# Patient Record
Sex: FEMALE | URBAN - METROPOLITAN AREA
[De-identification: names, ages, dates, MRNs, and addresses within clinical notes are randomized per-mention and may not be internally consistent; named-entity substitution may affect disease eponyms.]

---

## 2024-11-12 ENCOUNTER — TELEPHONE (OUTPATIENT)
Dept: CARDIOLOGY | Facility: CLINIC | Age: 74
End: 2024-11-12

## 2024-11-12 NOTE — TELEPHONE ENCOUNTER
Notified pt.    Layne Che APRN [place], Tracy G, [place]  4 mg today and resume normal dosing tomorrow.          Previous Messages       ----- Message -----  From: Joi Andrews MA  Sent: 11/12/2024   2:35 PM EST  To: NAT Estrada    Pt was 2.7 four weeks ago, she is 1.9 today,  She normally takes 2 m